# Patient Record
Sex: MALE | Race: WHITE | URBAN - METROPOLITAN AREA
[De-identification: names, ages, dates, MRNs, and addresses within clinical notes are randomized per-mention and may not be internally consistent; named-entity substitution may affect disease eponyms.]

---

## 2017-01-01 ENCOUNTER — AMBULATORY - HEALTHEAST (OUTPATIENT)
Dept: ADDICTION MEDICINE | Facility: CLINIC | Age: 67
End: 2017-01-01

## 2018-05-27 ENCOUNTER — TRANSFERRED RECORDS (OUTPATIENT)
Dept: HEALTH INFORMATION MANAGEMENT | Facility: CLINIC | Age: 68
End: 2018-05-27

## 2018-06-02 ENCOUNTER — HEALTH MAINTENANCE LETTER (OUTPATIENT)
Age: 68
End: 2018-06-02

## 2018-06-27 ENCOUNTER — TRANSFERRED RECORDS (OUTPATIENT)
Dept: HEALTH INFORMATION MANAGEMENT | Facility: CLINIC | Age: 68
End: 2018-06-27

## 2018-06-28 ENCOUNTER — HOSPITAL ENCOUNTER (INPATIENT)
Facility: CLINIC | Age: 68
LOS: 1 days | Discharge: HOME | DRG: 885 | End: 2018-06-29
Attending: PSYCHIATRY & NEUROLOGY | Admitting: PSYCHIATRY & NEUROLOGY
Payer: COMMERCIAL

## 2018-06-28 DIAGNOSIS — E53.8 VITAMIN B12 DEFICIENCY WITHOUT ANEMIA: ICD-10-CM

## 2018-06-28 DIAGNOSIS — F32.2 MAJOR DEPRESSIVE DISORDER, SINGLE EPISODE, SEVERE (H): Primary | ICD-10-CM

## 2018-06-28 PROBLEM — R45.851 SUICIDAL IDEATION: Status: ACTIVE | Noted: 2018-06-28

## 2018-06-28 PROCEDURE — 25000132 ZZH RX MED GY IP 250 OP 250 PS 637: Performed by: NURSE PRACTITIONER

## 2018-06-28 PROCEDURE — 12400007 ZZH R&B MH INTERMEDIATE UMMC

## 2018-06-28 RX ORDER — BISACODYL 10 MG
10 SUPPOSITORY, RECTAL RECTAL DAILY PRN
Status: DISCONTINUED | OUTPATIENT
Start: 2018-06-28 | End: 2018-06-29 | Stop reason: HOSPADM

## 2018-06-28 RX ORDER — QUETIAPINE FUMARATE 25 MG/1
25-50 TABLET, FILM COATED ORAL 3 TIMES DAILY PRN
Status: DISCONTINUED | OUTPATIENT
Start: 2018-06-28 | End: 2018-06-29 | Stop reason: HOSPADM

## 2018-06-28 RX ORDER — IBUPROFEN 200 MG
200-400 TABLET ORAL EVERY 4 HOURS PRN
Status: DISCONTINUED | OUTPATIENT
Start: 2018-06-28 | End: 2018-06-29 | Stop reason: HOSPADM

## 2018-06-28 RX ORDER — ALUMINA, MAGNESIA, AND SIMETHICONE 2400; 2400; 240 MG/30ML; MG/30ML; MG/30ML
30 SUSPENSION ORAL EVERY 4 HOURS PRN
Status: DISCONTINUED | OUTPATIENT
Start: 2018-06-28 | End: 2018-06-29 | Stop reason: HOSPADM

## 2018-06-28 RX ORDER — LANOLIN ALCOHOL/MO/W.PET/CERES
1000 CREAM (GRAM) TOPICAL DAILY
Status: DISCONTINUED | OUTPATIENT
Start: 2018-06-29 | End: 2018-06-29 | Stop reason: HOSPADM

## 2018-06-28 RX ORDER — OLANZAPINE 10 MG/2ML
5 INJECTION, POWDER, FOR SOLUTION INTRAMUSCULAR
Status: DISCONTINUED | OUTPATIENT
Start: 2018-06-28 | End: 2018-06-29 | Stop reason: HOSPADM

## 2018-06-28 RX ORDER — HYDROXYZINE HYDROCHLORIDE 25 MG/1
25 TABLET, FILM COATED ORAL EVERY 4 HOURS PRN
Status: DISCONTINUED | OUTPATIENT
Start: 2018-06-28 | End: 2018-06-29 | Stop reason: HOSPADM

## 2018-06-28 RX ORDER — OLANZAPINE 5 MG/1
5 TABLET ORAL
Status: DISCONTINUED | OUTPATIENT
Start: 2018-06-28 | End: 2018-06-29 | Stop reason: HOSPADM

## 2018-06-28 RX ORDER — QUETIAPINE FUMARATE 50 MG/1
50 TABLET, FILM COATED ORAL
Status: DISCONTINUED | OUTPATIENT
Start: 2018-06-28 | End: 2018-06-29 | Stop reason: HOSPADM

## 2018-06-28 RX ORDER — DULOXETIN HYDROCHLORIDE 20 MG/1
40 CAPSULE, DELAYED RELEASE ORAL DAILY
Status: DISCONTINUED | OUTPATIENT
Start: 2018-06-29 | End: 2018-06-29 | Stop reason: HOSPADM

## 2018-06-28 RX ADMIN — QUETIAPINE FUMARATE 50 MG: 25 TABLET ORAL at 19:43

## 2018-06-28 RX ADMIN — TRAZODONE HYDROCHLORIDE 150 MG: 100 TABLET ORAL at 22:11

## 2018-06-28 RX ADMIN — QUETIAPINE FUMARATE 50 MG: 50 TABLET ORAL at 22:11

## 2018-06-28 RX ADMIN — GABAPENTIN 700 MG: 400 CAPSULE ORAL at 19:42

## 2018-06-28 ASSESSMENT — ACTIVITIES OF DAILY LIVING (ADL)
GROOMING: INDEPENDENT
AMBULATION: 0-->INDEPENDENT
RETIRED_COMMUNICATION: 0-->UNDERSTANDS/COMMUNICATES WITHOUT DIFFICULTY
COGNITION: 0 - NO COGNITION ISSUES REPORTED
TOILETING: 0-->INDEPENDENT
ORAL_HYGIENE: INDEPENDENT
DRESS: 0-->INDEPENDENT
SWALLOWING: 0-->SWALLOWS FOODS/LIQUIDS WITHOUT DIFFICULTY
FALL_HISTORY_WITHIN_LAST_SIX_MONTHS: NO
BATHING: 0-->INDEPENDENT
TRANSFERRING: 0-->INDEPENDENT
DRESS: INDEPENDENT
LAUNDRY: UNABLE TO COMPLETE
RETIRED_EATING: 0-->INDEPENDENT

## 2018-06-28 NOTE — IP AVS SNAPSHOT
MRN:[id number]                      After Visit Summary   6/28/2018    Jose Luis Izaguirre    MRN: [id number]           Thank you!     Thank you for choosing [facility] for your care. Our goal is always to provide you with excellent care.        Patient Information     Date Of Birth          1950        Designated Caregiver       Most Recent Value    Caregiver    Will someone help with your care after discharge? no      About your hospital stay     You were admitted on:  June 28, 2018 You last received care in the:  [address]    You were discharged on:  June 29, 2018       Who to Call     For medical emergencies, please call 911.  For non-urgent questions about your medical care, please call your primary care provider or clinic, [phone]          Attending Provider     Provider Specialty    Luis Alberto Sosa MD Psychiatry       Primary Care Provider Office Phone # Fax #    Kelvin Rodríguez [phone]      Further instructions from your care team        Behavioral Discharge Planning and Instructions      Summary:  You were admitted on 6/28/2018  due to Depression and Suicidal Ideations.  You were treated by ROMIE Rios DNP and discharged on 06/29/18 from [facility]. You are requesting to discharge today, referrals for the [facility] and DBT have been listed below for you.  Manuel and Olga have [facility] workers available, they may be able to assist you with day to day challenges.    Principal Diagnosis:   MDD  PTSD    Health Care Follow-up Appointments:   Continue to work with your Therapist through [facility]    Dialectical Behavioral Therapy (DBT) Referral Services:  [facility]  [address], [address]  [place] [address]  Phone: [phone]    [facility] [place]  [address] [place] [address] [address]  [place], [place]  Phone: [phone]    [facility]  [address] [facility], [address]  [place] [address]  [phone]    [place] [facility] ([facility] groups)  [address] [facility]  [phone]  [phone]    Mental Health [address] [place] [address]  [phone]  ------------------------------------------------------------------------------------------------  [facility]: Case management referral  [address] [facility] [place] [address]  Phone: [phone]  -------------------------------------------------------------------------------------------------  Major Treatments, Procedures and Findings:  You were provided with: a psychiatric assessment, assessed for medical stability and milieu management    Symptoms to Report: Feeling more aggressive, increased confusion, losing more sleep, mood getting worse or thoughts of suicide    Early warning signs can include: Increased depression or anxiety sleep disturbances increased thoughts or behaviors of suicide or self-harm  increased unusual thinking, such as paranoia or hearing voices    Safety and Wellness:  Take all medicines as directed.  Make no changes unless your doctor suggests them.   Follow treatment recommendations.  Refrain from alcohol and non-prescribed drugs.  Ask your support system to help you reduce your access to items that could harm yourself or others. If there is a concern for safety, call 911.    Resources:   Crisis Intervention: 155.956.1664 or 411-728-7920 (TTY: 122.735.3733).  Call anytime for help.  National [place] on Mental Illness (www.[place].amrita.org): 839.375.1564 or 908-171-7240.  Alcoholics Anonymous (www.alcoholics-anonymous.org): Check your phone book for your local chapter.  Suicide Awareness Voices of Education (SAVE) (www.save.org): 979-488-LZFA (0283)  National Suicide Prevention Line (www.mentalhealthmn.org): 670-984-MVYY (8904)  Mental Health Consumer/Survivor Network of [place] (www.[facility].net): 232.790.3411 or 302-415-0906  Mental Health  "Association of [place] (www.mentalhealth.org): 287.410.3135 or 493-450-1166    The treatment team has appreciated the opportunity to work with you.     If you have any questions or concerns our unit number is [phone]_  You may be receiving a follow-up phone call within the next three days from a representative from behavioral health.    You have identified the best phone number to reach you as _______________________          Pending Results     Date and Time Order Name Status Description    2018 0030 Vitamin D In process             Admission Information     Date & Time Provider Department Dept. Phone    2018 Luis Alberto Sosa MD [address] [phone]      Your Vitals Were     Blood Pressure Pulse Temperature Respirations Height Weight    148/86 80 97.1  F (36.2  C) (Tympanic) 16 1.88 m (6' 2\") 103.1 kg (227 lb 4.8 oz)    Pulse Oximetry BMI (Body Mass Index)                98% 29.18 kg/m2          MyChart Information     [employer] lets you send messages to your doctor, view your test results, renew your prescriptions, schedule appointments and more. To sign up, go to [email or web address] . Click on \"Log in\" on the left side of the screen, which will take you to the Welcome page. Then click on \"Sign up Now\" on the right side of the page.     You will be asked to enter the access code listed below, as well as some personal information. Please follow the directions to create your username and password.     Your access code is: [email or web address]  Expires: 2018 12:52 PM     Your access code will  in 90 days. If you need help or a new code, please call your [facility] clinic or [phone].        Care EveryWhere ID     This is your Care EveryWhere ID. This could be used by other organizations to access your [facility] medical records  [id number]        Equal Access to Services     LEXX CHENEY AH: Hadii aad ku hadasho Soomaali, waaxdayana noyola waxay idiin " armida ashcristina la'aan ah. So [facility] [phone].    ATENCIÓN: Si habla silver, tiene a goldberg disposición servicios gratuitos de asistencia lingüística. Anabel al [phone].    We comply with applicable federal civil rights laws and [place] laws. We do not discriminate on the basis of race, color, national origin, age, disability, sex, sexual orientation, or gender identity.               Review of your medicines      START taking        Dose / Directions    DULoxetine HCl 40 MG Cpep   Used for:  Major depressive disorder, single episode, severe (H)        Dose:  40 mg   Start taking on:  6/30/2018   Take 40 mg by mouth daily   Quantity:  60 capsule   Refills:  0       gabapentin 100 MG capsule   Commonly known as:  NEURONTIN   Used for:  Major depressive disorder, single episode, severe (H)        Dose:  700 mg   Take 7 capsules (700 mg) by mouth 4 times daily   Quantity:  90 capsule   Refills:  0       * QUEtiapine 25 MG tablet   Commonly known as:  SEROquel   Used for:  Major depressive disorder, single episode, severe (H)        Dose:  25-50 mg   Take 1-2 tablets (25-50 mg) by mouth 3 times daily as needed (agitation)   Quantity:  60 tablet   Refills:  0       * QUEtiapine 50 MG tablet   Commonly known as:  SEROquel   Used for:  Major depressive disorder, single episode, severe (H)        Dose:  50 mg   Take 1 tablet (50 mg) by mouth nightly as needed (sleep and agitation)   Quantity:  120 tablet   Refills:  0       traZODone 150 MG tablet   Commonly known as:  DESYREL   Used for:  Major depressive disorder, single episode, severe (H)        Dose:  150 mg   Take 1 tablet (150 mg) by mouth nightly as needed for sleep   Quantity:  60 tablet   Refills:  0       * Notice:  This list has 2 medication(s) that are the same as other medications prescribed for you. Read the directions carefully, and ask your doctor or other care provider to review them with you.      CONTINUE these medicines which may have CHANGED,  or have new prescriptions. If we are uncertain of the size of tablets/capsules you have at home, strength may be listed as something that might have changed.        Dose / Directions    * cyanocobalamin 1000 MCG/ML injection   Commonly known as:  VITAMIN B12   This may have changed:  Another medication with the same name was added. Make sure you understand how and when to take each.   Used for:  Vitamin B 12 deficiency        Dose:  1 mL   Inject 1 mL as directed every 30 days. (every week for the first 4 weeks)   Quantity:  12 mL   Refills:  12       * cyanocobalamin 1000 MCG Tabs   This may have changed:  You were already taking a medication with the same name, and this prescription was added. Make sure you understand how and when to take each.   Used for:  Vitamin B12 deficiency without anemia        Dose:  1000 mcg   Start taking on:  6/30/2018   Take 1,000 mcg by mouth daily   Quantity:  30 tablet   Refills:  0       * Notice:  This list has 2 medication(s) that are the same as other medications prescribed for you. Read the directions carefully, and ask your doctor or other care provider to review them with you.      CONTINUE these medicines which have NOT CHANGED        Dose / Directions    IBU-200 PO        Take  by mouth.   Refills:  0                Protect others around you: Learn how to safely use, store and throw away your medicines at www.disposemymeds.org.             Medication List: This is a list of all your medications and when to take them. Check marks below indicate your daily home schedule. Keep this list as a reference.      Medications           Morning Afternoon Evening Bedtime As Needed    * cyanocobalamin 1000 MCG/ML injection   Commonly known as:  VITAMIN B12   Inject 1 mL as directed every 30 days. (every week for the first 4 weeks)                                * cyanocobalamin 1000 MCG Tabs   Take 1,000 mcg by mouth daily   Start taking on:  6/30/2018   Last time this was given:  1,000  mcg on 6/29/2018  8:26 AM                                DULoxetine HCl 40 MG Cpep   Take 40 mg by mouth daily   Start taking on:  6/30/2018   Last time this was given:  40 mg on 6/29/2018  8:25 AM                                gabapentin 100 MG capsule   Commonly known as:  NEURONTIN   Take 7 capsules (700 mg) by mouth 4 times daily   Last time this was given:  700 mg on 6/29/2018 12:23 PM                                IBU-200 PO   Take  by mouth.                                * QUEtiapine 25 MG tablet   Commonly known as:  SEROquel   Take 1-2 tablets (25-50 mg) by mouth 3 times daily as needed (agitation)   Last time this was given:  50 mg on 6/28/2018 10:11 PM                                * QUEtiapine 50 MG tablet   Commonly known as:  SEROquel   Take 1 tablet (50 mg) by mouth nightly as needed (sleep and agitation)   Last time this was given:  50 mg on 6/28/2018 10:11 PM                                traZODone 150 MG tablet   Commonly known as:  DESYREL   Take 1 tablet (150 mg) by mouth nightly as needed for sleep   Last time this was given:  150 mg on 6/28/2018 10:11 PM                                * Notice:  This list has 4 medication(s) that are the same as other medications prescribed for you. Read the directions carefully, and ask your doctor or other care provider to review them with you.

## 2018-06-28 NOTE — PROGRESS NOTES
Pt arrives to [facility] from [facility], due to suicidal ideation. Pt is ambulatory. Pt physical check in is done. Pt vital signs are within normal limits except for temp of 99.2.Pt denies feeling suicidal or self injurious at this time. PLAN: Continue admit process. Report is given to the danyel DISLA

## 2018-06-28 NOTE — PROGRESS NOTES
06/28/18 1521   Patient Belongings   Did you bring any home meds/supplements to the hospital?  Yes   Disposition of meds  Sent to security/pharmacy per site process   Patient Belongings body jewelry;cell phone/electronics;clothing;glasses;shoes;wallet   Belongings Search Yes   Clothing Search Yes   Second Staff Andres HERNÁNDEZ               Pt belongings: Shoes, glasses, watch, misc papers, notebook, shoe laces, hearing aids, dentures, cell phone, shorts, belt, shirt, loose change.    $20.00 cash and cards sent to security.           Admission:  I am responsible for any personal items that are not sent to the safe or pharmacy.  [facility] is not responsible for loss, theft or damage of any property in my possession.    Signature:  _________________________________ Date: _______  Time: _____                                              Staff Signature:  ____________________________ Date: ________  Time: _____      2nd Staff person, if patient is unable/unwilling to sign:    Signature: ________________________________ Date: ________  Time: _____     Discharge:  [facility] has returned all of my personal belongings:    Signature: _________________________________ Date: ________  Time: _____                                          Staff Signature:  ____________________________ Date: ________  Time: _____

## 2018-06-28 NOTE — IP AVS SNAPSHOT
[facility]    [address]    [place] [address]    Phone:  [phone]                                       After Visit Summary   6/28/2018    Jose Luis Izaguirre    MRN: [id number]           After Visit Summary Signature Page     I have received my discharge instructions, and my questions have been answered. I have discussed any challenges I see with this plan with the nurse or doctor.    ..........................................................................................................................................  Patient/Patient Representative Signature      ..........................................................................................................................................  Patient Representative Print Name and Relationship to Patient    ..................................................               ................................................  Date                                            Time    ..........................................................................................................................................  Reviewed by Signature/Title    ...................................................              ..............................................  Date                                                            Time

## 2018-06-29 VITALS
HEIGHT: 74 IN | OXYGEN SATURATION: 98 % | WEIGHT: 227.3 LBS | TEMPERATURE: 97.1 F | BODY MASS INDEX: 29.17 KG/M2 | RESPIRATION RATE: 16 BRPM | SYSTOLIC BLOOD PRESSURE: 148 MMHG | HEART RATE: 80 BPM | DIASTOLIC BLOOD PRESSURE: 86 MMHG

## 2018-06-29 LAB
ALBUMIN SERPL-MCNC: 3.2 G/DL (ref 3.4–5)
ALP SERPL-CCNC: 85 U/L (ref 40–150)
ALT SERPL W P-5'-P-CCNC: 15 U/L (ref 0–70)
ANION GAP SERPL CALCULATED.3IONS-SCNC: 9 MMOL/L (ref 3–14)
AST SERPL W P-5'-P-CCNC: 18 U/L (ref 0–45)
BASOPHILS # BLD AUTO: 0 10E9/L (ref 0–0.2)
BASOPHILS NFR BLD AUTO: 0.3 %
BILIRUB SERPL-MCNC: 0.5 MG/DL (ref 0.2–1.3)
BUN SERPL-MCNC: 16 MG/DL (ref 7–30)
CALCIUM SERPL-MCNC: 8.4 MG/DL (ref 8.5–10.1)
CHLORIDE SERPL-SCNC: 109 MMOL/L (ref 94–109)
CO2 SERPL-SCNC: 24 MMOL/L (ref 20–32)
CREAT SERPL-MCNC: 0.88 MG/DL (ref 0.66–1.25)
DEPRECATED CALCIDIOL+CALCIFEROL SERPL-MC: 17 UG/L (ref 20–75)
DIFFERENTIAL METHOD BLD: ABNORMAL
EOSINOPHIL # BLD AUTO: 0.2 10E9/L (ref 0–0.7)
EOSINOPHIL NFR BLD AUTO: 2.8 %
ERYTHROCYTE [DISTWIDTH] IN BLOOD BY AUTOMATED COUNT: 18.1 % (ref 10–15)
FOLATE SERPL-MCNC: 7.8 NG/ML
GFR SERPL CREATININE-BSD FRML MDRD: 85 ML/MIN/1.7M2
GLUCOSE SERPL-MCNC: 112 MG/DL (ref 70–99)
HCT VFR BLD AUTO: 44.7 % (ref 40–53)
HGB BLD-MCNC: 13.8 G/DL (ref 13.3–17.7)
IMM GRANULOCYTES # BLD: 0 10E9/L (ref 0–0.4)
IMM GRANULOCYTES NFR BLD: 0.1 %
LYMPHOCYTES # BLD AUTO: 2.8 10E9/L (ref 0.8–5.3)
LYMPHOCYTES NFR BLD AUTO: 41.2 %
MCH RBC QN AUTO: 24.6 PG (ref 26.5–33)
MCHC RBC AUTO-ENTMCNC: 30.9 G/DL (ref 31.5–36.5)
MCV RBC AUTO: 80 FL (ref 78–100)
MONOCYTES # BLD AUTO: 0.5 10E9/L (ref 0–1.3)
MONOCYTES NFR BLD AUTO: 8 %
NEUTROPHILS # BLD AUTO: 3.2 10E9/L (ref 1.6–8.3)
NEUTROPHILS NFR BLD AUTO: 47.6 %
NRBC # BLD AUTO: 0 10*3/UL
NRBC BLD AUTO-RTO: 0 /100
PLATELET # BLD AUTO: 195 10E9/L (ref 150–450)
POTASSIUM SERPL-SCNC: 4.4 MMOL/L (ref 3.4–5.3)
PROT SERPL-MCNC: 6.4 G/DL (ref 6.8–8.8)
RBC # BLD AUTO: 5.62 10E12/L (ref 4.4–5.9)
SODIUM SERPL-SCNC: 142 MMOL/L (ref 133–144)
TSH SERPL DL<=0.005 MIU/L-ACNC: 2.01 MU/L (ref 0.4–4)
VIT B12 SERPL-MCNC: 245 PG/ML (ref 193–986)
WBC # BLD AUTO: 6.8 10E9/L (ref 4–11)

## 2018-06-29 PROCEDURE — 84443 ASSAY THYROID STIM HORMONE: CPT | Performed by: NURSE PRACTITIONER

## 2018-06-29 PROCEDURE — 99236 HOSP IP/OBS SAME DATE HI 85: CPT | Performed by: NURSE PRACTITIONER

## 2018-06-29 PROCEDURE — 25000132 ZZH RX MED GY IP 250 OP 250 PS 637: Performed by: NURSE PRACTITIONER

## 2018-06-29 PROCEDURE — 99207 ZZC CONSULT E&M CHANGED TO INITIAL LEVEL: CPT | Performed by: PHYSICIAN ASSISTANT

## 2018-06-29 PROCEDURE — 85025 COMPLETE CBC W/AUTO DIFF WBC: CPT | Performed by: NURSE PRACTITIONER

## 2018-06-29 PROCEDURE — 97150 GROUP THERAPEUTIC PROCEDURES: CPT | Mod: GO

## 2018-06-29 PROCEDURE — 36415 COLL VENOUS BLD VENIPUNCTURE: CPT | Performed by: NURSE PRACTITIONER

## 2018-06-29 PROCEDURE — 82746 ASSAY OF FOLIC ACID SERUM: CPT | Performed by: NURSE PRACTITIONER

## 2018-06-29 PROCEDURE — 25000132 ZZH RX MED GY IP 250 OP 250 PS 637: Performed by: PSYCHIATRY & NEUROLOGY

## 2018-06-29 PROCEDURE — 82607 VITAMIN B-12: CPT | Performed by: NURSE PRACTITIONER

## 2018-06-29 PROCEDURE — 80053 COMPREHEN METABOLIC PANEL: CPT | Performed by: NURSE PRACTITIONER

## 2018-06-29 PROCEDURE — 82306 VITAMIN D 25 HYDROXY: CPT | Performed by: NURSE PRACTITIONER

## 2018-06-29 PROCEDURE — 99222 1ST HOSP IP/OBS MODERATE 55: CPT | Performed by: PHYSICIAN ASSISTANT

## 2018-06-29 RX ORDER — TRAZODONE HYDROCHLORIDE 150 MG/1
150 TABLET ORAL
Qty: 60 TABLET | COMMUNITY
Start: 2018-06-29

## 2018-06-29 RX ORDER — DULOXETINE 40 MG/1
40 CAPSULE, DELAYED RELEASE ORAL DAILY
Qty: 60 CAPSULE | COMMUNITY
Start: 2018-06-30

## 2018-06-29 RX ORDER — GABAPENTIN 100 MG/1
700 CAPSULE ORAL 4 TIMES DAILY
Qty: 90 CAPSULE | COMMUNITY
Start: 2018-06-29

## 2018-06-29 RX ORDER — QUETIAPINE FUMARATE 50 MG/1
50 TABLET, FILM COATED ORAL
Qty: 120 TABLET | COMMUNITY
Start: 2018-06-29

## 2018-06-29 RX ORDER — QUETIAPINE FUMARATE 25 MG/1
25-50 TABLET, FILM COATED ORAL 3 TIMES DAILY PRN
Qty: 60 TABLET | COMMUNITY
Start: 2018-06-29

## 2018-06-29 RX ADMIN — GABAPENTIN 700 MG: 400 CAPSULE ORAL at 08:26

## 2018-06-29 RX ADMIN — GABAPENTIN 700 MG: 400 CAPSULE ORAL at 12:23

## 2018-06-29 RX ADMIN — CYANOCOBALAMIN TAB 1000 MCG 1000 MCG: 1000 TAB at 08:26

## 2018-06-29 RX ADMIN — DULOXETINE HYDROCHLORIDE 40 MG: 20 CAPSULE, DELAYED RELEASE ORAL at 08:25

## 2018-06-29 ASSESSMENT — ACTIVITIES OF DAILY LIVING (ADL)
GROOMING: INDEPENDENT
DRESS: INDEPENDENT
ORAL_HYGIENE: INDEPENDENT
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
## 2018-06-29 NOTE — DISCHARGE INSTRUCTIONS
Behavioral Discharge Planning and Instructions      Summary:  You were admitted on 6/28/2018  due to Depression and Suicidal Ideations.  You were treated by ROMIE Rios DNP and discharged on 06/29/18 from [facility] to Home. You are requesting to discharge today, referrals for the Critical access hospital and DBT have been listed below for you.  [facility] have [facility] workers available, they may be able to assist you with day to day challenges.    Principal Diagnosis:   MDD  PTSD    Health Care Follow-up Appointments:   Continue to work with your Therapist through [facility]    Dialectical Behavioral Therapy (DBT) Referral Services:  [facility]  [address]  [place] [address]  Phone: [phone]    [facility] [place]  [address] [place] [address] [address]  [place], [place]  Phone: [phone]    [facility]  [address], [place] [id number] [phone]    [facility] (coed groups)  [address]  [phone]  [phone]    [facility]  [address]  [place] [address]  [phone]  ------------------------------------------------------------------------------------------------  [facility]: Case management referral  [address] [place] [address]  Phone: [phone]  -------------------------------------------------------------------------------------------------  Major Treatments, Procedures and Findings:  You were provided with: a psychiatric assessment, assessed for medical stability and milieu management    Symptoms to Report: Feeling more aggressive, increased confusion, losing more sleep, mood getting worse or thoughts of suicide    Early warning signs can include: Increased depression or anxiety sleep disturbances increased thoughts or behaviors of suicide or self-harm  increased unusual thinking, such as paranoia  or hearing voices    Safety and Wellness:  Take all medicines as directed.  Make no changes unless your doctor suggests them.   Follow treatment recommendations.  Refrain from alcohol and non-prescribed drugs.  Ask your support system to help you reduce your access to items that could harm yourself or others. If there is a concern for safety, call 911.    Resources:   Crisis Intervention: 896.669.6969 or 278-322-8562 (TTY: 144.451.4939).  Call anytime for help.  National [place] on Mental Illness (www.[place].amrita.org): 979.839.6273 or 479-990-5240.  Alcoholics Anonymous (www.alcoholics-anonymous.org): Check your phone book for your local chapter.  Suicide Awareness Voices of Education (SAVE) (www.save.org): 093-512-MNQX (3483)  National Suicide Prevention Line (www.mentalhealthmn.org): 974-430-WDEJ (0970)  Mental Health Consumer/Survivor Network of [place] (www.mhcsn.net): 799.429.9887 or 218-574-4055  Mental Health Association of [place] (www.mentalhealth.org): 999.608.3273 or 698-738-8497    The treatment team has appreciated the opportunity to work with you.     If you have any questions or concerns our unit number is [phone]_  You may be receiving a follow-up phone call within the next three days from a representative from behavioral health.    You have identified the best phone number to reach you as _______________________

## 2018-06-29 NOTE — PLAN OF CARE
Problem: General Plan of Care (Inpatient Behavioral)  Goal: Individualization/Patient Specific Goal (IP Behavioral)  The patient and/or their representative will achieve their patient-specific goals related to the plan of care.    The patient-specific goals include:    Patient identified the following reasons for hospitalization:  Depression  Significant loss    Patient identified the follow goals for discharge:  DBT referral  CMM referral

## 2018-06-29 NOTE — PROGRESS NOTES
06/29/18 1300   General Information   Date Initially Attended OT 06/29/18   Clinical Impression   Affect Appropriate to situation;Anxious   Orientation Oriented to person, place and time   Appearance and ADLs General cleanliness observed in most areas   Attention to Internal Stimuli No observed signs   Interaction Skills Initiates appropriately with staff;Interacts appropriately with peers   Ability to Communicate Needs Independent   Verbal Content Clear;Appropriate to topic   Ability to Maintain Boundaries Maintains appropriate physical boundaries;Maintains appropriate verbal boundaries   Participation Participates with minimal encouragement   Concentration Concentrates 50 minutes   Ability to Concentrate With structure   Follows and Comprehends Directions Independently follows multi-step directions   Memory Delayed and immediate recall intact;Needs further assessment   Organization Independently organizes all tasks   Decision Making Independent   Planning and Problem Solving Needs further assessment   Ability to Apply and Learn Concepts Applies within group structure   Frustrations / Stress Tolerance Independently identifies sources of frustration/stress   Level of Insight Insightful into needs, issues, goals;Needs further assessment   Self Esteem Needs further assessment   Social Supports Identifies utilizing supports   General Observation/Plan   General Observations/Plan See Comments   Attended 1 of 2 OT groups, having left early to meet with staff. He was pleasant, worked at a continuous pace on the IPAD. He learned new steps though stated it more difficult with more complex problem solving. He returned to a familiar though complex task requiring using numbers and finding solutions within steps on task. He was successful in problem solving unfamiliar steps and chose to add complexity. No further plans with d/c today.

## 2018-06-29 NOTE — PROGRESS NOTES
SPIRITUAL HEALTH SERVICES  [facility] ([facility] [place] [place]) [facility]BW   ON-CALL VISIT    REFERRAL SOURCE: patient/family request at admission for chaplaincy support    Saw note that pt planned d/c today, called unit and pt affirmed to staff on phone that he declined [facility] visit as he was about to d/c momentarily.     PLAN: No follow up.      Lois Clancy MDiv, [facility]  Staff   Pager [phone]
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
## 2018-06-29 NOTE — CONSULTS
[facility]  Internal Medicine Consult     Jose Luis Izaguirre MRN# [id number]   Age: 67 year old YOB: 1950     Date of Admission: 6/28/2018  Date of Consult:  6/29/2018    Primary Care Provider: Kelvin Rodríguez    Requesting Service: Dr. Ervin  Reason for Consult: General Medical Evaluation      SUBJECTIVE   CC:   Vitamin B12 deficiency    Assessment and Plan/Recommendations:   Jose Luis Izaguirre is a 67 year old male with history of HCV, vitamin B12 deficiency, PTSD, depression, and anxiety who was admitted to station [facility] with SI    Depression, anxiety, PTSD: With SI and plan  - Management per psych     Vitamin B12 deficiency: 2/2 Gastric bypass 1999. PTA on daily B12. Continue     Orthostatic hypotension: /86 sitting to 123/89 standing. Reports intermittent dizziness at home but no syncope or pre-syncope   - Encourage fluids  - Will watch pressures    HCV: S/p treatment 1999. No acute concerns      Medicine will peripherally follow pressures Please contact if future questions or concerns arise. Thank you for the opportunity to be a part of this patient's care.      Rula Diaz  Internal Medicine [facility] Hospitalist  [phone]  June 29, 2018         HPI:   Jose Luis Izaguirre is a 67 year old male with history of HCV, vitamin B12 deficiency, PTSD, depression, and anxiety who was admitted to station 3[facility] with SI    Patient reports he is overall healthy. He does report intermittent dizziness with standing at home. He takes drinks with electrolytes for this. Reports mild dizziness this am with vital check. No syncope or pre-syncope. Denies falls related to this. Reports gastric bypass in 1999. Denies recent illness, fever, headache, confusion, chest pain, dyspnea, cough, abdominal pain, N/V, urinary symptoms, change in bowels, peripheral edema, new onset joint pain, or rash       Past Medical History:     Past Medical History:   Diagnosis Date     ALCOHOL ABUSE, IN  "REMISSION      Hepatitis C 99-00     Thyroid nodule      Vitamin B12 deficiencies         Reviewed and updated in [facility].     Past Surgical History:      Past Surgical History:   Procedure Laterality Date     COLONOSCOPY  4-1-13    Return for Colonoscopy in 5 yrs.     GASTRIC BYPASS  1999      BIOPSY PERCUT UNLISTED ENDOCRINE SYSTEM  1997    Thyroid nodule removed. RT.     JOINT REPLACEMTN, KNEE RT/LT  2000    bilateral         Social History:   Tobacco use: Denies  Alcohol use: Denies  Elicit drug use: Denies     Family History:     Family History   Problem Relation Age of Onset     Diabetes Mother      Arthritis Mother      Cancer Mother      Depression Mother      Obesity Mother      Psychotic Disorder Mother      Asthma Father      Arthritis Father      Cancer Father      Respiratory Father          Allergies:     Allergies   Allergen Reactions     No Known Drug Allergies          Medications:   Reviewed. Please see MAR     Review of Systems:   10 point ROS of systems including Constitutional, Eyes, Respiratory, Cardiovascular, Gastroenterology, Genitourinary, Integumentary, Muscularskeletal, Psychiatric were all negative except for pertinent positives noted in my HPI.      OBJECTIVE   Physical Exam:   Vitals were reviewed  Blood pressure 148/86, pulse 80, temperature 97.1  F (36.2  C), temperature source Tympanic, resp. rate 16, height 1.88 m (6' 2\"), weight 103.1 kg (227 lb 4.8 oz), SpO2 98 %.  General: Alert, NAD, pleasant  HEENT: Anicteric sclera, EOMI, membranes moist. L hearing aid noted  Cardiovascular: RRR, S1S2. No murmur noted  Lungs: CTAB without wheezing or crackles   GI: Abdomen soft, non-tender with bowel sounds present. No guarding or rebound present  Vascular: Trace peripheral edema, distal pulses palpable  Neurologic: AAO X 3, no focal deficits  Neuropsychiatric: Per psych  Skin: No jaundice, rashes, or lesions        Data:        Lab Results   Component Value Date     03/14/2013    Lab " Results   Component Value Date    CHLORIDE 102 03/14/2013    Lab Results   Component Value Date    BUN 10 03/14/2013      Lab Results   Component Value Date    POTASSIUM 3.8 03/14/2013    Lab Results   Component Value Date    CO2 23 03/14/2013    Lab Results   Component Value Date    CR 0.74 03/14/2013        Lab Results   Component Value Date    WBC 5.9 03/14/2013    HGB 11.8 (L) 03/14/2013    HCT 36.9 (L) 03/14/2013    MCV 73 (L) 03/14/2013     03/14/2013     Lab Results   Component Value Date    WBC 5.9 03/14/2013

## 2018-06-29 NOTE — PLAN OF CARE
Problem: Patient Care Overview  Goal: Team Discussion  Team Plan:   BEHAVIORAL TEAM DISCUSSION    Participants: Laly Duggan DNP, Bill Weber, RN, ASHWIN Sneed, Elena Johnson OT  Progress: New admit  Continued Stay Criteria/Rationale: Depression  Medical/Physical: See medical notes  Precautions:   Behavioral Orders   Procedures     Code 1 - Restrict to Unit     Routine Programming     As clinically indicated     Status 15     Every 15 minutes.     Suicide precautions     Patients on Suicide Precautions should have a Combination Diet ordered that includes a Diet selection(s) AND a Behavioral Tray selection for Safe Tray - with utensils, or Safe Tray - NO utensils       Plan: The plan is to assess the patient for mental health and medication needs.  The patient will be prescribed medications to treat the identified symptoms.  Upon discharge the patient will be referred to services as appropriate based on the assessment.  Rationale for change in precautions or plan:N/A      Problem: General Plan of Care (Inpatient Behavioral)  Goal: Team Discussion  Team Plan:   BEHAVIORAL TEAM DISCUSSION    Participants: Laly Duggan DNP, Bill Weber, RN, ASHWIN Sneed, Elena Johnson, GERTRUDIS  Progress: New admit  Continued Stay Criteria/Rationale: Depression  Medical/Physical: See medical notes  Precautions:   Behavioral Orders   Procedures     Code 1 - Restrict to Unit     Routine Programming     As clinically indicated     Status 15     Every 15 minutes.     Suicide precautions     Patients on Suicide Precautions should have a Combination Diet ordered that includes a Diet selection(s) AND a Behavioral Tray selection for Safe Tray - with utensils, or Safe Tray - NO utensils       Plan: The plan is to assess the patient for mental health and medication needs.  The patient will be prescribed medications to treat the identified symptoms.  Upon discharge the patient will be referred to services as appropriate based on the  assessment.  Rationale for change in precautions or plan:N/A

## 2020-03-13 ENCOUNTER — TRANSFERRED RECORDS (OUTPATIENT)
Dept: HEALTH INFORMATION MANAGEMENT | Facility: CLINIC | Age: 70
End: 2020-03-13

## 2020-07-02 ENCOUNTER — TRANSFERRED RECORDS (OUTPATIENT)
Dept: HEALTH INFORMATION MANAGEMENT | Facility: CLINIC | Age: 70
End: 2020-07-02

## 2020-07-03 LAB
ALT SERPL-CCNC: 12 U/L (ref 8–45)
AST SERPL-CCNC: 19 IU/L (ref 2–40)
CREAT SERPL-MCNC: 1.57 MG/DL (ref 0.72–1.25)
GFR SERPL CREATININE-BSD FRML MDRD: 44 ML/MIN/1.73M2
GLUCOSE SERPL-MCNC: 103 MG/DL (ref 65–100)
POTASSIUM SERPL-SCNC: 3.9 MMOL/L (ref 3.5–5)

## 2021-06-08 NOTE — PROGRESS NOTES
[facility] CHEMICAL DEPENDENCY  DISCHARGE SUMMARY            NAME:  Jose Luis Izaguirre   Physician:  Shannasridhar Asher   MRN:  [id number] :  Latasha Henry   #:  [id number] Funding Source:  [facility]   Admit Date: 2016 Discharge Date: 2016     :  1950 Hours Completed: 23 Days    Initial Diagnosis:    Patient Active Problem List   Diagnosis     Severe alcohol use disorder     Hypertension     Bariatric surgery status     Dysthymia     Chronic hepatitis C virus infection     Prolonged QT interval     Posttraumatic stress disorder     Dehydration     Neuroleptic consent form signed on 16     Anxiety state    Final Diagnosis:    Patient Active Problem List   Diagnosis     Severe alcohol use disorder     Hypertension     Bariatric surgery status     Dysthymia     Chronic hepatitis C virus infection     Prolonged QT interval     Posttraumatic stress disorder     Dehydration     Neuroleptic consent form signed on 16     Anxiety state      Discharge Address:    [address] [place] [id number]      Discharge Type:  With Staff Approval (WSA)    Reasons for and circumstances of service termination:  The patient was discharged with staff approval.  He completed the guidelines of the program as well as all of his assignments.  The patient will be moving on to other CD services and at this time is residing in his own home.      Dimension/Course of Treatment/Individualized Care:   1.  Withdrawal Potential - Risk level - 0  The patient had no positive UA results while in the program.  He was medically detoxified using Lorazepam, it was recommended that he begin a Naltrexone injection upon discharge however the patient did not want to comply with that recommendation as it has not worked for him in the past.     2.  Biomedical Conditions and Complications - Risk level - 1  At this time the patient is able to obtain medical help if needed.  There are no immediate  medical or dental needs that need to be met.  The patient does have significant medical problems however that do need to continue to be addressed and monitored by his primary care provider.  At this time the patient is able to tolerate any pain or discomfort he may be experiencing.     3.  Emotional/Behavioral/Cognitive Conditions and Complications - Risk level - 2  The patient does have mental health diagnoses.  He does report significant grief due to the loss of his wife.  He attended mental health groups while in the program.  At this time there is no risk for suicide or self harm.     4.  Treatment Acceptance/Resistance - Risk Level - 0  The patient complied with the program expectations.  He completed his assignments and presented them to the group as requested.  He participated in the group setting most of the time and provided and accepted feedback from his peers.  The patient was referred to [facility] for continued CD treatment.    5.  Relapse/Continued Use/Continued Problem Potential - Risk level-3  The patient lacks coping skills and knowledge on how to implement them into his daily life.  He does have some insight to his disease however may not fully understand it.  At this time the patient is still at high risk for relapse as he lacks knowledge on relapse prevention.     6.  Recovery Environment - Risk level - 3  The patient reports he is moving to a safe place at this time.  He is interested in obtaining sober housing and is going to continue working towards that goal.  The patient does lack sober support from peer groups and family.  The patient is encouraged to attend recovery meetings on a regular basis at this time.    Strengths: The patient was unable to identify his strenghts  Needs:  Grief counseling, mental health services, CD services, coping skills and daily structure  Services Provided:  Detoxification, mental health services, CD services, relapse prevention group, occupational therapy, community  services, nursing services.     Program Involvement: Good  Attendance: Excellent  Ability to relate in group/   Other program activities: Good  Assignment Completion: Excellent  Overall Behavior: Excellent  Reported Family/Significant   Other Involvement: Unknown    Prognosis: Good      Recommendations       Attend 12 Step Meetings, Obtain/Retain 12 Step Program Sponsor, Discharged to Other CD Services, Identify and Maintain a Sober Social and Network of Friends    Mental Health Referral  Individual Therapy, Med Compliance and grief counseling      Physical Health Referral:  Personal Physician        Counselor Name and Title:  Latasha Henry        Date:  1/1/2017  Time:  1:43 PM